# Patient Record
Sex: FEMALE | Race: BLACK OR AFRICAN AMERICAN | NOT HISPANIC OR LATINO | URBAN - METROPOLITAN AREA
[De-identification: names, ages, dates, MRNs, and addresses within clinical notes are randomized per-mention and may not be internally consistent; named-entity substitution may affect disease eponyms.]

---

## 2023-12-20 ENCOUNTER — APPOINTMENT (RX ONLY)
Dept: URBAN - METROPOLITAN AREA CLINIC 159 | Facility: CLINIC | Age: 48
Setting detail: DERMATOLOGY
End: 2023-12-20

## 2023-12-20 DIAGNOSIS — D49.2 NEOPLASM OF UNSPECIFIED BEHAVIOR OF BONE, SOFT TISSUE, AND SKIN: ICD-10-CM

## 2023-12-20 PROCEDURE — 11102 TANGNTL BX SKIN SINGLE LES: CPT

## 2023-12-20 PROCEDURE — ? BIOPSY BY SHAVE METHOD

## 2023-12-20 ASSESSMENT — LOCATION DETAILED DESCRIPTION DERM
LOCATION DETAILED: LEFT ANTERIOR SHOULDER
LOCATION DETAILED: LEFT ANTERIOR SHOULDER

## 2023-12-20 ASSESSMENT — LOCATION ZONE DERM
LOCATION ZONE: ARM
LOCATION ZONE: ARM

## 2023-12-20 ASSESSMENT — LOCATION SIMPLE DESCRIPTION DERM
LOCATION SIMPLE: LEFT SHOULDER
LOCATION SIMPLE: LEFT SHOULDER

## 2024-06-04 ENCOUNTER — OFFICE VISIT (OUTPATIENT)
Dept: URBAN - METROPOLITAN AREA CLINIC 92 | Facility: CLINIC | Age: 49
End: 2024-06-04
Payer: COMMERCIAL

## 2024-06-04 ENCOUNTER — DASHBOARD ENCOUNTERS (OUTPATIENT)
Age: 49
End: 2024-06-04

## 2024-06-04 VITALS
SYSTOLIC BLOOD PRESSURE: 101 MMHG | WEIGHT: 177.6 LBS | HEIGHT: 61 IN | DIASTOLIC BLOOD PRESSURE: 68 MMHG | BODY MASS INDEX: 33.53 KG/M2 | TEMPERATURE: 96.7 F | HEART RATE: 63 BPM

## 2024-06-04 DIAGNOSIS — K59.09 CHRONIC CONSTIPATION: ICD-10-CM

## 2024-06-04 PROCEDURE — 99204 OFFICE O/P NEW MOD 45 MIN: CPT | Performed by: INTERNAL MEDICINE

## 2024-06-04 RX ORDER — PROMETHAZINE HYDROCHLORIDE AND DEXTROMETHORPHAN HYDROBROMIDE ORAL SOLUTION 15; 6.25 MG/5ML; MG/5ML
SOLUTION ORAL
Qty: 118 UNSPECIFIED | Status: ON HOLD | COMMUNITY

## 2024-06-04 RX ORDER — CHLOPHEDIANOL HCL AND PYRILAMINE MALEATE 12.5; 12.5 MG/5ML; MG/5ML
TAKE 5 MLS BY MOUTH 3 (THREE) TIMES DAILY AS NEEDED SOLUTION ORAL
Qty: 100 MILLILITER | Refills: 0 | Status: ON HOLD | COMMUNITY

## 2024-06-04 RX ORDER — TRAMADOL HYDROCHLORIDE 50 MG/1
TAKE 1 TABLET BY MOUTH EVERY DAY AS NEEDED **INS ONLY COVERS MAX OF 7 DAYS* TABLET, FILM COATED ORAL
Qty: 7 EACH | Refills: 0 | Status: ON HOLD | COMMUNITY

## 2024-06-04 RX ORDER — METHYLPREDNISOLONE 4 MG/1
TABLET ORAL
Qty: 21 TABLET | Status: ON HOLD | COMMUNITY

## 2024-06-04 RX ORDER — ALBUTEROL SULFATE 90 UG/1
AEROSOL, METERED RESPIRATORY (INHALATION)
Qty: 8 UNSPECIFIED | Status: ON HOLD | COMMUNITY

## 2024-06-04 RX ORDER — AMOXICILLIN AND CLAVULANATE POTASSIUM 875; 125 MG/1; MG/1
TABLET, FILM COATED ORAL
Qty: 14 TABLET | Status: ON HOLD | COMMUNITY

## 2024-06-04 RX ORDER — CYCLOBENZAPRINE HYDROCHLORIDE 10 MG/1
TAKE 1/2 TO 1 TABLET BY MOUTH AT BEDTIME AS NEEDED TABLET, FILM COATED ORAL
Qty: 20 EACH | Refills: 0 | Status: ON HOLD | COMMUNITY

## 2024-06-04 RX ORDER — DOXYCYCLINE HYCLATE 100 MG/1
TAKE 1 CAPSULE (100 MG TOTAL) BY MOUTH IN THE MORNING AND BEFORE BEDTIME FOR 7 DAYS CAPSULE ORAL
Qty: 14 EACH | Refills: 0 | Status: ON HOLD | COMMUNITY

## 2024-06-04 RX ORDER — IBUPROFEN 800 MG/1
TAKE 1 TABLET BY MOUTH EVERY 8 HOURS AS NEEDED WITH FOOD TABLET, FILM COATED ORAL
Qty: 60 EACH | Refills: 0 | Status: ON HOLD | COMMUNITY

## 2024-06-04 NOTE — HPI-TODAY'S VISIT:
48yF with no sig PMH who presents to discuss chronic constipation. Can go up to 2 weeks without a BM. Sounds like it is acute on chronic. Some bleeding and mucus. Has tried senna tea and Miralax. Miralax eventually helped a bit. Had a colonoscopy that was normal, last year. Hx of 3 C-sections, no hx of vaginal deliveries. Notes abdominal bloating.

## 2025-06-10 ENCOUNTER — OFFICE VISIT (OUTPATIENT)
Dept: URBAN - METROPOLITAN AREA CLINIC 109 | Facility: CLINIC | Age: 50
End: 2025-06-10
Payer: COMMERCIAL

## 2025-06-10 ENCOUNTER — LAB OUTSIDE AN ENCOUNTER (OUTPATIENT)
Dept: URBAN - METROPOLITAN AREA CLINIC 109 | Facility: CLINIC | Age: 50
End: 2025-06-10

## 2025-06-10 DIAGNOSIS — Z80.0 FAMILY HISTORY OF COLON CANCER: ICD-10-CM

## 2025-06-10 DIAGNOSIS — K59.04 CHRONIC IDIOPATHIC CONSTIPATION: ICD-10-CM

## 2025-06-10 PROBLEM — 82934008: Status: ACTIVE | Noted: 2025-06-10

## 2025-06-10 PROBLEM — 312824007: Status: ACTIVE | Noted: 2025-06-10

## 2025-06-10 PROCEDURE — 99214 OFFICE O/P EST MOD 30 MIN: CPT | Performed by: INTERNAL MEDICINE

## 2025-06-10 RX ORDER — IBUPROFEN 800 MG/1
TAKE 1 TABLET BY MOUTH EVERY 8 HOURS AS NEEDED WITH FOOD TABLET, FILM COATED ORAL
Qty: 60 EACH | Refills: 0 | Status: ON HOLD | COMMUNITY

## 2025-06-10 RX ORDER — TRAMADOL HYDROCHLORIDE 50 MG/1
TAKE 1 TABLET BY MOUTH EVERY DAY AS NEEDED **INS ONLY COVERS MAX OF 7 DAYS* TABLET, FILM COATED ORAL
Qty: 7 EACH | Refills: 0 | Status: ON HOLD | COMMUNITY

## 2025-06-10 RX ORDER — CHLOPHEDIANOL HCL AND PYRILAMINE MALEATE 12.5; 12.5 MG/5ML; MG/5ML
TAKE 5 MLS BY MOUTH 3 (THREE) TIMES DAILY AS NEEDED SOLUTION ORAL
Qty: 100 MILLILITER | Refills: 0 | Status: ON HOLD | COMMUNITY

## 2025-06-10 RX ORDER — METHYLPREDNISOLONE 4 MG/1
TABLET ORAL
Qty: 21 TABLET | Status: ON HOLD | COMMUNITY

## 2025-06-10 RX ORDER — CYCLOBENZAPRINE HYDROCHLORIDE 10 MG/1
TAKE 1/2 TO 1 TABLET BY MOUTH AT BEDTIME AS NEEDED TABLET, FILM COATED ORAL
Qty: 20 EACH | Refills: 0 | Status: ON HOLD | COMMUNITY

## 2025-06-10 RX ORDER — DOXYCYCLINE HYCLATE 100 MG/1
TAKE 1 CAPSULE (100 MG TOTAL) BY MOUTH IN THE MORNING AND BEFORE BEDTIME FOR 7 DAYS CAPSULE ORAL
Qty: 14 EACH | Refills: 0 | Status: ON HOLD | COMMUNITY

## 2025-06-10 RX ORDER — ALBUTEROL SULFATE 90 UG/1
AEROSOL, METERED RESPIRATORY (INHALATION)
Qty: 8 UNSPECIFIED | Status: ON HOLD | COMMUNITY

## 2025-06-10 RX ORDER — AMOXICILLIN AND CLAVULANATE POTASSIUM 875; 125 MG/1; MG/1
TABLET, FILM COATED ORAL
Qty: 14 TABLET | Status: ON HOLD | COMMUNITY

## 2025-06-10 RX ORDER — PROMETHAZINE HYDROCHLORIDE AND DEXTROMETHORPHAN HYDROBROMIDE ORAL SOLUTION 15; 6.25 MG/5ML; MG/5ML
SOLUTION ORAL
Qty: 118 UNSPECIFIED | Status: ON HOLD | COMMUNITY

## 2025-06-10 NOTE — HPI-TODAY'S VISIT:
6/10/25 - 50 yo BF here for evaluation of constipation.  BMs pattern prior to 2015 was ~ 2x/wk.  Since 2015, she has been constipated with BMs ~ 2x/month.  When she has a BM, initially it will be scyballous, and then she will have a dam burst and have loose and liquid stool nonstop for ~ 3-4 hours.  She will have cramping and nausea with vomiting, and sweats.  No significant change over the years.  No clear precipitating event. [employer] works. Father had colon cancer in his 50s. Pt had colonoscopy 2 years that was normal, in [place].  Weight stable.  Has BRB in stool with episodes.   --------------------- 6/4/24 - 48yF with no sig PMH who presents to discuss chronic constipation. Can go up to 2 weeks without a BM. Sounds like it is acute on chronic. Some bleeding and mucus. Has tried senna tea and Miralax. Miralax eventually helped a bit. Had a colonoscopy that was normal, last year. Hx of 3 C-sections, no hx of vaginal deliveries. Notes abdominal bloating.

## 2025-07-16 ENCOUNTER — TELEPHONE ENCOUNTER (OUTPATIENT)
Dept: URBAN - METROPOLITAN AREA CLINIC 109 | Facility: CLINIC | Age: 50
End: 2025-07-16

## 2025-07-16 RX ORDER — LINACLOTIDE 72 UG/1
1 CAPSULE AT LEAST 30 MINUTES BEFORE THE FIRST MEAL OF THE DAY ON AN EMPTY STOMACH CAPSULE, GELATIN COATED ORAL ONCE A DAY
Qty: 30 | Refills: 1 | OUTPATIENT
Start: 2025-07-21 | End: 2025-09-19

## 2025-07-22 ENCOUNTER — OFFICE VISIT (OUTPATIENT)
Dept: URBAN - METROPOLITAN AREA CLINIC 109 | Facility: CLINIC | Age: 50
End: 2025-07-22

## 2025-07-28 ENCOUNTER — TELEPHONE ENCOUNTER (OUTPATIENT)
Dept: URBAN - METROPOLITAN AREA CLINIC 118 | Facility: CLINIC | Age: 50
End: 2025-07-28

## 2025-07-28 RX ORDER — LINACLOTIDE 72 UG/1
1 CAPSULE AT LEAST 30 MINUTES BEFORE THE FIRST MEAL OF THE DAY ON AN EMPTY STOMACH CAPSULE, GELATIN COATED ORAL ONCE A DAY
Qty: 30 | Refills: 1
Start: 2025-07-21 | End: 2025-09-28

## 2025-07-31 ENCOUNTER — TELEPHONE ENCOUNTER (OUTPATIENT)
Dept: URBAN - METROPOLITAN AREA CLINIC 118 | Facility: CLINIC | Age: 50
End: 2025-07-31

## 2025-08-08 ENCOUNTER — TELEPHONE ENCOUNTER (OUTPATIENT)
Dept: URBAN - METROPOLITAN AREA CLINIC 109 | Facility: CLINIC | Age: 50
End: 2025-08-08

## 2025-08-19 ENCOUNTER — OFFICE VISIT (OUTPATIENT)
Dept: URBAN - METROPOLITAN AREA CLINIC 109 | Facility: CLINIC | Age: 50
End: 2025-08-19